# Patient Record
Sex: FEMALE | Race: WHITE | NOT HISPANIC OR LATINO | Employment: FULL TIME | URBAN - METROPOLITAN AREA
[De-identification: names, ages, dates, MRNs, and addresses within clinical notes are randomized per-mention and may not be internally consistent; named-entity substitution may affect disease eponyms.]

---

## 2020-03-29 ENCOUNTER — NURSE TRIAGE (OUTPATIENT)
Dept: ADMINISTRATIVE | Facility: CLINIC | Age: 40
End: 2020-03-29

## 2020-03-29 NOTE — TELEPHONE ENCOUNTER
Reason for Disposition   General information question, no triage required and triager able to answer question    Protocols used: INFORMATION ONLY CALL-A-AH    Pt stated she has been in direct contact with a Pt who tested positive for COVID 19 and now she has to be tested to go back to work. Pt stated she has a dry cough and mild wheezing. Pt advised to discuss with a Health Care Provider at an [facility] virtual visit. Pt accepted virtual visit information, and verbalized understanding of instructions. Advised to go to the ED for sob/difficulty breathing.

## 2021-03-03 ENCOUNTER — TELEPHONE (OUTPATIENT)
Dept: RHEUMATOLOGY | Facility: CLINIC | Age: 41
End: 2021-03-03

## 2021-03-30 ENCOUNTER — OFFICE VISIT (OUTPATIENT)
Dept: RHEUMATOLOGY | Facility: CLINIC | Age: 41
End: 2021-03-30
Payer: COMMERCIAL

## 2021-03-30 VITALS
DIASTOLIC BLOOD PRESSURE: 80 MMHG | BODY MASS INDEX: 26.98 KG/M2 | WEIGHT: 158.06 LBS | SYSTOLIC BLOOD PRESSURE: 112 MMHG | HEIGHT: 64 IN

## 2021-03-30 DIAGNOSIS — L98.3 WELLS' SYNDROME: ICD-10-CM

## 2021-03-30 DIAGNOSIS — Z79.899 HIGH RISK MEDICATION USE: ICD-10-CM

## 2021-03-30 DIAGNOSIS — R79.82 CRP ELEVATED: ICD-10-CM

## 2021-03-30 DIAGNOSIS — Z71.89 COUNSELING ON HEALTH PROMOTION AND DISEASE PREVENTION: ICD-10-CM

## 2021-03-30 DIAGNOSIS — M25.50 ARTHRALGIA, UNSPECIFIED JOINT: Primary | ICD-10-CM

## 2021-03-30 PROCEDURE — 99999 PR PBB SHADOW E&M-EST. PATIENT-LVL III: CPT | Mod: PBBFAC,,, | Performed by: INTERNAL MEDICINE

## 2021-03-30 PROCEDURE — 1125F AMNT PAIN NOTED PAIN PRSNT: CPT | Mod: S$GLB,,, | Performed by: INTERNAL MEDICINE

## 2021-03-30 PROCEDURE — 99205 OFFICE O/P NEW HI 60 MIN: CPT | Mod: S$GLB,,, | Performed by: INTERNAL MEDICINE

## 2021-03-30 PROCEDURE — 99205 PR OFFICE/OUTPT VISIT, NEW, LEVL V, 60-74 MIN: ICD-10-PCS | Mod: S$GLB,,, | Performed by: INTERNAL MEDICINE

## 2021-03-30 PROCEDURE — 99999 PR PBB SHADOW E&M-EST. PATIENT-LVL III: ICD-10-PCS | Mod: PBBFAC,,, | Performed by: INTERNAL MEDICINE

## 2021-03-30 PROCEDURE — 3008F BODY MASS INDEX DOCD: CPT | Mod: CPTII,S$GLB,, | Performed by: INTERNAL MEDICINE

## 2021-03-30 PROCEDURE — 1125F PR PAIN SEVERITY QUANTIFIED, PAIN PRESENT: ICD-10-PCS | Mod: S$GLB,,, | Performed by: INTERNAL MEDICINE

## 2021-03-30 PROCEDURE — 3008F PR BODY MASS INDEX (BMI) DOCUMENTED: ICD-10-PCS | Mod: CPTII,S$GLB,, | Performed by: INTERNAL MEDICINE

## 2021-03-30 RX ORDER — BUDESONIDE 0.25 MG/2ML
0.25 INHALANT ORAL DAILY
COMMUNITY

## 2021-03-30 RX ORDER — FOLIC ACID 1 MG/1
1 TABLET ORAL DAILY
Qty: 30 TABLET | Refills: 4 | Status: SHIPPED | OUTPATIENT
Start: 2021-03-30 | End: 2022-03-30

## 2021-03-30 RX ORDER — ESCITALOPRAM OXALATE 20 MG/1
TABLET ORAL
COMMUNITY
Start: 2021-03-05

## 2021-03-30 RX ORDER — ALBUTEROL SULFATE 90 UG/1
AEROSOL, METERED RESPIRATORY (INHALATION)
COMMUNITY
Start: 2021-03-11

## 2021-03-30 RX ORDER — METHOTREXATE 2.5 MG/1
10 TABLET ORAL
Qty: 16 TABLET | Refills: 3 | Status: SHIPPED | OUTPATIENT
Start: 2021-03-30 | End: 2022-11-16

## 2021-03-30 RX ORDER — HYDROXYCHLOROQUINE SULFATE 200 MG/1
200 TABLET, FILM COATED ORAL 2 TIMES DAILY
COMMUNITY
Start: 2021-03-05 | End: 2022-11-16

## 2021-03-30 RX ORDER — BUDESONIDE AND FORMOTEROL FUMARATE DIHYDRATE 160; 4.5 UG/1; UG/1
2 AEROSOL RESPIRATORY (INHALATION) 2 TIMES DAILY
COMMUNITY
Start: 2021-03-29

## 2021-03-30 RX ORDER — FAMOTIDINE 40 MG/1
40 TABLET, FILM COATED ORAL 2 TIMES DAILY
COMMUNITY
Start: 2021-03-05

## 2021-03-30 RX ORDER — FLUTICASONE FUROATE 200 UG/1
1 POWDER RESPIRATORY (INHALATION) DAILY
COMMUNITY
Start: 2021-03-12

## 2021-04-01 ENCOUNTER — TELEPHONE (OUTPATIENT)
Dept: RHEUMATOLOGY | Facility: CLINIC | Age: 41
End: 2021-04-01

## 2021-04-12 ENCOUNTER — TELEPHONE (OUTPATIENT)
Dept: RHEUMATOLOGY | Facility: CLINIC | Age: 41
End: 2021-04-12

## 2021-04-19 ENCOUNTER — TELEPHONE (OUTPATIENT)
Dept: RHEUMATOLOGY | Facility: CLINIC | Age: 41
End: 2021-04-19

## 2021-04-29 ENCOUNTER — PATIENT MESSAGE (OUTPATIENT)
Dept: RESEARCH | Facility: HOSPITAL | Age: 41
End: 2021-04-29

## 2022-03-10 ENCOUNTER — PATIENT MESSAGE (OUTPATIENT)
Dept: RHEUMATOLOGY | Facility: CLINIC | Age: 42
End: 2022-03-10
Payer: COMMERCIAL

## 2022-04-29 ENCOUNTER — TELEPHONE (OUTPATIENT)
Dept: RHEUMATOLOGY | Facility: CLINIC | Age: 42
End: 2022-04-29
Payer: COMMERCIAL

## 2022-10-07 ENCOUNTER — OFFICE VISIT (OUTPATIENT)
Dept: RHEUMATOLOGY | Facility: CLINIC | Age: 42
End: 2022-10-07
Payer: COMMERCIAL

## 2022-10-07 ENCOUNTER — HOSPITAL ENCOUNTER (OUTPATIENT)
Dept: RADIOLOGY | Facility: HOSPITAL | Age: 42
Discharge: HOME | End: 2022-10-07
Attending: INTERNAL MEDICINE
Payer: COMMERCIAL

## 2022-10-07 VITALS
WEIGHT: 154.56 LBS | DIASTOLIC BLOOD PRESSURE: 70 MMHG | BODY MASS INDEX: 26.39 KG/M2 | SYSTOLIC BLOOD PRESSURE: 109 MMHG | HEART RATE: 68 BPM | HEIGHT: 64 IN

## 2022-10-07 DIAGNOSIS — G93.32 CHRONIC FATIGUE SYNDROME: Primary | ICD-10-CM

## 2022-10-07 DIAGNOSIS — L98.3 WELLS' SYNDROME: ICD-10-CM

## 2022-10-07 DIAGNOSIS — G93.32 CHRONIC FATIGUE SYNDROME: ICD-10-CM

## 2022-10-07 DIAGNOSIS — R53.1 WEAKNESS: ICD-10-CM

## 2022-10-07 DIAGNOSIS — M47.819 SPONDYLOARTHROPATHY: Primary | ICD-10-CM

## 2022-10-07 PROCEDURE — 3074F PR MOST RECENT SYSTOLIC BLOOD PRESSURE < 130 MM HG: ICD-10-PCS | Mod: CPTII,S$GLB,, | Performed by: INTERNAL MEDICINE

## 2022-10-07 PROCEDURE — 1159F MED LIST DOCD IN RCRD: CPT | Mod: CPTII,S$GLB,, | Performed by: INTERNAL MEDICINE

## 2022-10-07 PROCEDURE — 72200 X-RAY EXAM SI JOINTS: CPT | Mod: 26,,, | Performed by: STUDENT IN AN ORGANIZED HEALTH CARE EDUCATION/TRAINING PROGRAM

## 2022-10-07 PROCEDURE — 99999 PR PBB SHADOW E&M-EST. PATIENT-LVL IV: ICD-10-PCS | Mod: PBBFAC,,, | Performed by: INTERNAL MEDICINE

## 2022-10-07 PROCEDURE — 3074F SYST BP LT 130 MM HG: CPT | Mod: CPTII,S$GLB,, | Performed by: INTERNAL MEDICINE

## 2022-10-07 PROCEDURE — 72200 X-RAY EXAM SI JOINTS: CPT | Mod: TC

## 2022-10-07 PROCEDURE — 99999 PR PBB SHADOW E&M-EST. PATIENT-LVL IV: CPT | Mod: PBBFAC,,, | Performed by: INTERNAL MEDICINE

## 2022-10-07 PROCEDURE — 72200 XR SACROILIAC JOINTS 3 VIEWS: ICD-10-PCS | Mod: 26,,, | Performed by: STUDENT IN AN ORGANIZED HEALTH CARE EDUCATION/TRAINING PROGRAM

## 2022-10-07 PROCEDURE — 3078F PR MOST RECENT DIASTOLIC BLOOD PRESSURE < 80 MM HG: ICD-10-PCS | Mod: CPTII,S$GLB,, | Performed by: INTERNAL MEDICINE

## 2022-10-07 PROCEDURE — 3008F BODY MASS INDEX DOCD: CPT | Mod: CPTII,S$GLB,, | Performed by: INTERNAL MEDICINE

## 2022-10-07 PROCEDURE — 99215 OFFICE O/P EST HI 40 MIN: CPT | Mod: S$GLB,,, | Performed by: INTERNAL MEDICINE

## 2022-10-07 PROCEDURE — 3008F PR BODY MASS INDEX (BMI) DOCUMENTED: ICD-10-PCS | Mod: CPTII,S$GLB,, | Performed by: INTERNAL MEDICINE

## 2022-10-07 PROCEDURE — 99215 PR OFFICE/OUTPT VISIT, EST, LEVL V, 40-54 MIN: ICD-10-PCS | Mod: S$GLB,,, | Performed by: INTERNAL MEDICINE

## 2022-10-07 PROCEDURE — 3078F DIAST BP <80 MM HG: CPT | Mod: CPTII,S$GLB,, | Performed by: INTERNAL MEDICINE

## 2022-10-07 PROCEDURE — 1159F PR MEDICATION LIST DOCUMENTED IN MEDICAL RECORD: ICD-10-PCS | Mod: CPTII,S$GLB,, | Performed by: INTERNAL MEDICINE

## 2022-10-07 RX ORDER — GABAPENTIN 100 MG/1
100 CAPSULE ORAL 3 TIMES DAILY
Qty: 90 CAPSULE | Refills: 3 | Status: SHIPPED | OUTPATIENT
Start: 2022-10-07 | End: 2023-10-07

## 2022-10-07 NOTE — PROGRESS NOTES
"Contacted patient to discuss imaging results and recommendations with her per Dr. Hood. Patient verbalized understanding. All questions answered.     Martha Marnia (Allye), [facility]  Rheumatology Department   "

## 2022-10-07 NOTE — PROGRESS NOTES
RHEUMATOLOGY OUTPATIENT CLINIC NOTE    10/7/2022    Attending Rheumatologist: Apolinar Hood  Primary Care Provider/Physician Requesting Consultation: MALACHI Raman MD   Chief Complaint/Reason For Consultation:  No chief complaint on file.      Subjective:     Sabrina White is a 42 y.o. 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    Main concern of chronic widespread arthralgias.  Sub-adequate response with Plaquenil, did not undergo methotrexate trial recommended on last encounter.    Review of Systems   Constitutional:  Positive for malaise/fatigue. Negative for fever.   HENT:  Negative for nosebleeds.         Denies significant sicca symptoms   Eyes:  Negative for photophobia and pain.        No history uveitis   Respiratory:  Negative for hemoptysis and shortness of breath.    Gastrointestinal:  Negative for blood in stool, diarrhea and melena.        Denies dysphagia.   Genitourinary:  Negative for dysuria, hematuria and urgency.   Musculoskeletal:  Positive for joint pain (Hands and lower body, predominant mechanical pattern.).   Skin:  Negative for rash.        Denies Raynaud phenomena.  Denies photosensitivity.  No history of psoriasis the patient is aware of.   Neurological:  Negative for focal weakness.   Endo/Heme/Allergies:         No history of vascular thrombosis.     Chronic comorbid conditions affecting medical decision making today:  Past Medical History:   Diagnosis Date    GERD (gastroesophageal reflux disease)      History reviewed. No pertinent surgical history.  History reviewed. No pertinent family history.  Social History     Tobacco Use   Smoking Status Never   Smokeless Tobacco Never       Current Outpatient Medications:     ARNUITY ELLIPTA 200 mcg/actuation inhaler, Inhale 1 puff into the lungs once daily., Disp: , Rfl:     benralizumab (FASENRA SUBQ), Inject into the skin. Every 4 weeks, Disp: , Rfl:     budesonide (PULMICORT) 0.25 mg/2 mL  nebulizer solution, Take 0.25 mg by nebulization once daily. Controller, Disp: , Rfl:     EScitalopram oxalate (LEXAPRO) 20 MG tablet, , Disp: , Rfl:     famotidine (PEPCID) 40 MG tablet, Take 40 mg by mouth 2 (two) times daily. , Disp: , Rfl:     hydrOXYchloroQUINE (PLAQUENIL) 200 mg tablet, Take 200 mg by mouth 2 (two) times daily. , Disp: , Rfl:     SYMBICORT 160-4.5 mcg/actuation HFAA, Inhale 2 puffs into the lungs 2 (two) times daily., Disp: , Rfl:     VENTOLIN HFA 90 mcg/actuation inhaler, SMARTSI Puff(s) By Mouth Every 4 Hours PRN, Disp: , Rfl:     folic acid (FOLVITE) 1 MG tablet, Take 1 tablet (1 mg total) by mouth once daily., Disp: 30 tablet, Rfl: 4    methotrexate 2.5 MG Tab, Take 4 tablets (10 mg total) by mouth every 7 days., Disp: 16 tablet, Rfl: 3    ondansetron (ZOFRAN) 4 MG tablet, Take 1 tablet (4 mg total) by mouth every 8 (eight) hours as needed for Nausea. (Patient not taking: Reported on 10/7/2022), Disp: 15 tablet, Rfl: 0    ondansetron (ZOFRAN-ODT) 8 MG TbDL, Take 1 tablet (8 mg total) by mouth every 8 (eight) hours as needed., Disp: 15 tablet, Rfl: 0    promethazine (PHENERGAN) 25 MG suppository, Place 1 suppository (25 mg total) rectally every 6 (six) hours as needed for Nausea., Disp: 10 suppository, Rfl: 0     Objective:     Vitals:    10/07/22 0714   BP: 109/70   Pulse: 68     Physical Exam   Eyes: Conjunctivae are normal.   Pulmonary/Chest: Effort normal. No respiratory distress.   Musculoskeletal:         General: No swelling or tenderness. Normal range of motion.   Neurological: She displays no weakness.   Skin: No rash noted.     Reviewed available old and all outside pertinent medical records available.    All lab results personally reviewed and interpreted by me.       ASSESSMENT:     Chronic fatigue syndrome    Weakness    Chronic low back pain    Well syndrome (eosinophilic cellulitis)    PLAN:     Follow-up encounter for chronic fatigue/pain syndrome.  History of well  syndrome without active rashes.  On chronic therapy with Plaquenil per immunology.  Patient main concern of widespread pain and refractory intermittent rash.  Describes similar appearance to previously biopsy rash with features of well syndrome.  Review of system negative for photosensitivity, Raynaud's phenomena, or significant sicca symptoms.  Describes subjective weakness.  Exam nonfocal without reproducible muscle weakness, suspicious rashes, or significant squeeze tenderness suggestive of subclinical synovitis.  Notable [place] nodules with tenderness to palpation.  No recent labs on file.  Previously without significant cytopenias or evidence of liver or kidney dysfunction.  ROB provided negative, CCP antibody negative.  Imaging on file without serositis, lymphadenopathy, marginal erosive changes, or ankylosis.  Left upper abdomen biopsy May 2020 with characteristics of eosinophilic cellulitis with negative direct immunofluorescence.  Recommend repeating rheumatic autoimmune labs for prognosis and determine if patient falls on the category of a particular rheumatic disorder.  Recommend evaluation by Dermatology for eosinophilic cellulitis.  So adequate response to Plaquenil, might benefit from addition of other steroid sparing agent previously recommended methotrexate however patient would like to receive a solid diagnosis before proceeding with pharmacotherapy trial.  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  Consider therapy with anticonvulsant or SNRI after ruling out primary rheumatic autoimmune condition.      Disclaimer: This note is prepared using voice recognition software and as such is likely to have errors and has not been proof read. Please contact me for questions.       Apolinar Hood M.D.

## 2022-10-24 ENCOUNTER — HOSPITAL ENCOUNTER (OUTPATIENT)
Dept: RADIOLOGY | Facility: HOSPITAL | Age: 42
Discharge: HOME | End: 2022-10-24
Attending: INTERNAL MEDICINE
Payer: COMMERCIAL

## 2022-10-24 DIAGNOSIS — M47.819 SPONDYLOARTHROPATHY: ICD-10-CM

## 2022-10-24 PROCEDURE — 72197 MRI PELVIS W/O & W/DYE: CPT | Mod: 26,,, | Performed by: RADIOLOGY

## 2022-10-24 PROCEDURE — 72197 MRI PELVIS W/O & W/DYE: CPT | Mod: TC,PO

## 2022-10-24 PROCEDURE — A9585 GADOBUTROL INJECTION: HCPCS | Mod: PO | Performed by: INTERNAL MEDICINE

## 2022-10-24 PROCEDURE — 72197 MRI SACROILIAC JOINTS W W/O CONTRAST: ICD-10-PCS | Mod: 26,,, | Performed by: RADIOLOGY

## 2022-10-24 PROCEDURE — 25500020 PHARM REV CODE 255: Mod: PO | Performed by: INTERNAL MEDICINE

## 2022-10-24 RX ORDER — GADOBUTROL 604.72 MG/ML
7 INJECTION INTRAVENOUS
Status: COMPLETED | OUTPATIENT
Start: 2022-10-24 | End: 2022-10-24

## 2022-10-24 RX ADMIN — GADOBUTROL 7 ML: 604.72 INJECTION INTRAVENOUS at 04:10

## 2022-11-15 ENCOUNTER — PATIENT MESSAGE (OUTPATIENT)
Dept: RHEUMATOLOGY | Facility: CLINIC | Age: 42
End: 2022-11-15
Payer: COMMERCIAL

## 2022-11-16 ENCOUNTER — OFFICE VISIT (OUTPATIENT)
Dept: RHEUMATOLOGY | Facility: CLINIC | Age: 42
End: 2022-11-16
Payer: COMMERCIAL

## 2022-11-16 ENCOUNTER — PATIENT MESSAGE (OUTPATIENT)
Dept: RHEUMATOLOGY | Facility: CLINIC | Age: 42
End: 2022-11-16

## 2022-11-16 VITALS
DIASTOLIC BLOOD PRESSURE: 79 MMHG | BODY MASS INDEX: 25.35 KG/M2 | SYSTOLIC BLOOD PRESSURE: 118 MMHG | HEART RATE: 73 BPM | HEIGHT: 64 IN | WEIGHT: 148.5 LBS

## 2022-11-16 DIAGNOSIS — M15.9 PRIMARY OSTEOARTHRITIS INVOLVING MULTIPLE JOINTS: Primary | ICD-10-CM

## 2022-11-16 DIAGNOSIS — M25.50 POLYARTHRALGIA: ICD-10-CM

## 2022-11-16 DIAGNOSIS — M79.7 FIBROMYALGIA: ICD-10-CM

## 2022-11-16 PROCEDURE — 3008F PR BODY MASS INDEX (BMI) DOCUMENTED: ICD-10-PCS | Mod: CPTII,S$GLB,, | Performed by: INTERNAL MEDICINE

## 2022-11-16 PROCEDURE — 99214 OFFICE O/P EST MOD 30 MIN: CPT | Mod: S$GLB,,, | Performed by: INTERNAL MEDICINE

## 2022-11-16 PROCEDURE — 99999 PR PBB SHADOW E&M-EST. PATIENT-LVL III: CPT | Mod: PBBFAC,,, | Performed by: INTERNAL MEDICINE

## 2022-11-16 PROCEDURE — 3008F BODY MASS INDEX DOCD: CPT | Mod: CPTII,S$GLB,, | Performed by: INTERNAL MEDICINE

## 2022-11-16 PROCEDURE — 3078F PR MOST RECENT DIASTOLIC BLOOD PRESSURE < 80 MM HG: ICD-10-PCS | Mod: CPTII,S$GLB,, | Performed by: INTERNAL MEDICINE

## 2022-11-16 PROCEDURE — 3074F SYST BP LT 130 MM HG: CPT | Mod: CPTII,S$GLB,, | Performed by: INTERNAL MEDICINE

## 2022-11-16 PROCEDURE — 3074F PR MOST RECENT SYSTOLIC BLOOD PRESSURE < 130 MM HG: ICD-10-PCS | Mod: CPTII,S$GLB,, | Performed by: INTERNAL MEDICINE

## 2022-11-16 PROCEDURE — 99214 PR OFFICE/OUTPT VISIT, EST, LEVL IV, 30-39 MIN: ICD-10-PCS | Mod: S$GLB,,, | Performed by: INTERNAL MEDICINE

## 2022-11-16 PROCEDURE — 3078F DIAST BP <80 MM HG: CPT | Mod: CPTII,S$GLB,, | Performed by: INTERNAL MEDICINE

## 2022-11-16 PROCEDURE — 1159F PR MEDICATION LIST DOCUMENTED IN MEDICAL RECORD: ICD-10-PCS | Mod: CPTII,S$GLB,, | Performed by: INTERNAL MEDICINE

## 2022-11-16 PROCEDURE — 99999 PR PBB SHADOW E&M-EST. PATIENT-LVL III: ICD-10-PCS | Mod: PBBFAC,,, | Performed by: INTERNAL MEDICINE

## 2022-11-16 PROCEDURE — 1159F MED LIST DOCD IN RCRD: CPT | Mod: CPTII,S$GLB,, | Performed by: INTERNAL MEDICINE

## 2022-11-16 RX ORDER — CYCLOBENZAPRINE HCL 10 MG
10 TABLET ORAL NIGHTLY
Qty: 30 TABLET | Refills: 3 | Status: SHIPPED | OUTPATIENT
Start: 2022-11-16 | End: 2022-11-26

## 2022-11-16 NOTE — PROGRESS NOTES
RHEUMATOLOGY OUTPATIENT CLINIC NOTE    11/16/2022    Attending Rheumatologist: Apolinar Hood  Primary Care Provider/Physician Requesting Consultation: MALACHI Raman MD   Chief Complaint/Reason For Consultation:  Joint Pain, Wells Syndrome, and Chronic fatigue sydrome      Subjective:     Sabrina White is a 42 y.o. White female with fibromyalgia for follow up visit.    Main concern of chronic intermittent widespread arthralgias with mechanical pattern.    Review of Systems   Constitutional:  Positive for malaise/fatigue. Negative for fever.   Eyes:  Negative for pain.   Respiratory:  Negative for shortness of breath.    Gastrointestinal:  Negative for blood in stool and melena.   Musculoskeletal:  Positive for joint pain and myalgias.   Skin:  Negative for rash.   Neurological:  Negative for focal weakness.     Chronic comorbid conditions affecting medical decision making today:  Past Medical History:   Diagnosis Date    GERD (gastroesophageal reflux disease)      History reviewed. No pertinent surgical history.  History reviewed. No pertinent family history.  Social History     Tobacco Use   Smoking Status Never   Smokeless Tobacco Never       Current Outpatient Medications:     ARNUITY ELLIPTA 200 mcg/actuation inhaler, Inhale 1 puff into the lungs once daily., Disp: , Rfl:     benralizumab (FASENRA SUBQ), Inject into the skin. Every 4 weeks, Disp: , Rfl:     budesonide (PULMICORT) 0.25 mg/2 mL nebulizer solution, Take 0.25 mg by nebulization once daily. Controller, Disp: , Rfl:     EScitalopram oxalate (LEXAPRO) 20 MG tablet, , Disp: , Rfl:     famotidine (PEPCID) 40 MG tablet, Take 40 mg by mouth 2 (two) times daily. , Disp: , Rfl:     gabapentin (NEURONTIN) 100 MG capsule, Take 1 capsule (100 mg total) by mouth 3 (three) times daily., Disp: 90 capsule, Rfl: 3    hydrOXYchloroQUINE (PLAQUENIL) 200 mg tablet, Take 200 mg by mouth 2 (two) times daily. , Disp: , Rfl:     ondansetron (ZOFRAN) 4 MG tablet,  Take 1 tablet (4 mg total) by mouth every 8 (eight) hours as needed for Nausea., Disp: 15 tablet, Rfl: 0    ondansetron (ZOFRAN-ODT) 8 MG TbDL, Take 1 tablet (8 mg total) by mouth every 8 (eight) hours as needed., Disp: 15 tablet, Rfl: 0    promethazine (PHENERGAN) 25 MG suppository, Place 1 suppository (25 mg total) rectally every 6 (six) hours as needed for Nausea., Disp: 10 suppository, Rfl: 0    SYMBICORT 160-4.5 mcg/actuation HFAA, Inhale 2 puffs into the lungs 2 (two) times daily., Disp: , Rfl:     VENTOLIN HFA 90 mcg/actuation inhaler, SMARTSI Puff(s) By Mouth Every 4 Hours PRN, Disp: , Rfl:     folic acid (FOLVITE) 1 MG tablet, Take 1 tablet (1 mg total) by mouth once daily., Disp: 30 tablet, Rfl: 4    methotrexate 2.5 MG Tab, Take 4 tablets (10 mg total) by mouth every 7 days., Disp: 16 tablet, Rfl: 3     Objective:     Vitals:    22 1252   BP: 118/79   Pulse: 73     Physical Exam    Reviewed available old and all outside pertinent medical records available.    All lab results personally reviewed and interpreted by me.     ASSESSMENT:     Fibromyalgia    Erosive osteoarthritis    Knee pain    PLAN:     Follow-up encounter for symptoms of fibromyalgia.  No response to trial of Plaquenil or methotrexate.  Main concern of chronic widespread intermittent pain.  Remains without active photosensitive rashes, ulcerative skin lesions, or current joint swelling.  No reproducible muscle weakness or active synovitis on exam.  Unrevealing rheumatic workup.  Imaging on file without marginal erosive changes or ankylosis.  MRI without evidence of inflammatory sacroiliitis.  Adequate response to gabapentin, currently 100 mg t.i.d. Started approximately 1 month ago, still too early to see full response to therapy expected after 3 months.  May continue gabapentin dose adjustment with primary care provider.  Addition of Flexeril for breakthrough pain.  Side effects therapy discussed, written literature provided.  CBC  CMP to monitor for toxicity related to medication use, repeat labs prior follow-up encounter.      Disclaimer: This note is prepared using voice recognition software and as such is likely to have errors and has not been proof read. Please contact me for questions.         Apolinar Hood M.D.

## 2023-12-04 ENCOUNTER — TELEPHONE (OUTPATIENT)
Dept: RHEUMATOLOGY | Facility: CLINIC | Age: 43
End: 2023-12-04
Payer: COMMERCIAL

## 2023-12-04 NOTE — TELEPHONE ENCOUNTER
----- Message from Maira Hunter sent at 12/4/2023  9:49 AM CST -----  Type: Appointment Request    Caller is requesting a appointment.     Name of Caller: Pt   When is the first available appointment? No  dates  given    Symptoms: Schedule  an  appointment   Would the patient rather a call back or a response via [facility]? Call   Best Call Back Number:[phone]  Additional Information:

## 2023-12-04 NOTE — TELEPHONE ENCOUNTER
"Returned patients phone call. Patient stated that she missed her visit with Dr. Hood last week due to her having the FLU. Offered patient virtual visit with Dr. Hood in the morning 12/05/2023 at 7:30. Patient gladly accepted and thanked me for the return phone call. All questions answered.      Martha Marina (Allye), [facility]  Rheumatology Department    "

## 2023-12-05 ENCOUNTER — OFFICE VISIT (OUTPATIENT)
Dept: RHEUMATOLOGY | Facility: CLINIC | Age: 43
End: 2023-12-05
Payer: COMMERCIAL

## 2023-12-05 DIAGNOSIS — M15.9 PRIMARY OSTEOARTHRITIS INVOLVING MULTIPLE JOINTS: ICD-10-CM

## 2023-12-05 DIAGNOSIS — M79.7 FIBROMYALGIA: ICD-10-CM

## 2023-12-05 DIAGNOSIS — R53.82 CHRONIC FATIGUE: ICD-10-CM

## 2023-12-05 DIAGNOSIS — M54.50 CHRONIC LOW BACK PAIN, UNSPECIFIED BACK PAIN LATERALITY, UNSPECIFIED WHETHER SCIATICA PRESENT: ICD-10-CM

## 2023-12-05 DIAGNOSIS — G89.29 CHRONIC LOW BACK PAIN, UNSPECIFIED BACK PAIN LATERALITY, UNSPECIFIED WHETHER SCIATICA PRESENT: ICD-10-CM

## 2023-12-05 DIAGNOSIS — M70.60 TROCHANTERIC BURSITIS, UNSPECIFIED LATERALITY: ICD-10-CM

## 2023-12-05 DIAGNOSIS — M15.4 EROSIVE OSTEOARTHRITIS: Primary | ICD-10-CM

## 2023-12-05 PROCEDURE — 99214 OFFICE O/P EST MOD 30 MIN: CPT | Mod: 95,,, | Performed by: INTERNAL MEDICINE

## 2023-12-05 PROCEDURE — 99214 PR OFFICE/OUTPT VISIT, EST, LEVL IV, 30-39 MIN: ICD-10-PCS | Mod: 95,,, | Performed by: INTERNAL MEDICINE

## 2023-12-05 NOTE — PROGRESS NOTES
The patient location is: [place]  The chief complaint leading to consultation is: pain    Visit type: audiovisual    Face to Face time with patient: 20  30 minutes of total time spent on the encounter, which includes face to face time and non-face to face time preparing to see the patient (eg, review of tests), Obtaining and/or reviewing separately obtained history, Documenting clinical information in the electronic or other health record, Independently interpreting results (not separately reported) and communicating results to the patient/family/caregiver, or Care coordination (not separately reported).         Each patient to whom he or she provides medical services by telemedicine is:  (1) informed of the relationship between the physician and patient and the respective role of any other health care provider with respect to management of the patient; and (2) notified that he or she may decline to receive medical services by telemedicine and may withdraw from such care at any time.       RHEUMATOLOGY OUTPATIENT CLINIC NOTE    12/5/2023    Attending Rheumatologist: Apolinar Hood  Primary Care Provider/Physician Requesting Consultation: CINDY Raman MD   Chief Complaint/Reason For Consultation:  No chief complaint on file.      Subjective:     Sabrina White is a 43 y.o. White female with FMS    Interval self-discontinuation of gabapentin.  Symptomatic relief from self insight and increased aerobic activity.    Review of Systems   Constitutional:  Negative for fever.   Eyes:  Negative for redness.   Respiratory:  Negative for cough and shortness of breath.    Cardiovascular:  Negative for chest pain.   Gastrointestinal:  Negative for constipation and diarrhea.   Genitourinary:  Negative for dysuria.   Musculoskeletal:  Positive for joint pain. Negative for back pain.   Skin:  Negative for rash.   Neurological:  Negative for headaches.   Endo/Heme/Allergies:  Does not bruise/bleed easily.       Chronic comorbid  conditions affecting medical decision making today:  Past Medical History:   Diagnosis Date    GERD (gastroesophageal reflux disease)      No past surgical history on file.  No family history on file.  Social History     Tobacco Use   Smoking Status Never   Smokeless Tobacco Never       Current Outpatient Medications:     ARNUITY ELLIPTA 200 mcg/actuation inhaler, Inhale 1 puff into the lungs once daily., Disp: , Rfl:     benralizumab (FASENRA SUBQ), Inject into the skin. Every 4 weeks, Disp: , Rfl:     budesonide (PULMICORT) 0.25 mg/2 mL nebulizer solution, Take 0.25 mg by nebulization once daily. Controller, Disp: , Rfl:     EScitalopram oxalate (LEXAPRO) 20 MG tablet, , Disp: , Rfl:     famotidine (PEPCID) 40 MG tablet, Take 40 mg by mouth 2 (two) times daily. , Disp: , Rfl:     folic acid (FOLVITE) 1 MG tablet, Take 1 tablet (1 mg total) by mouth once daily., Disp: 30 tablet, Rfl: 4    gabapentin (NEURONTIN) 100 MG capsule, Take 1 capsule (100 mg total) by mouth 3 (three) times daily., Disp: 90 capsule, Rfl: 3    ondansetron (ZOFRAN) 4 MG tablet, Take 1 tablet (4 mg total) by mouth every 8 (eight) hours as needed for Nausea., Disp: 15 tablet, Rfl: 0    ondansetron (ZOFRAN-ODT) 8 MG TbDL, Take 1 tablet (8 mg total) by mouth every 8 (eight) hours as needed., Disp: 15 tablet, Rfl: 0    promethazine (PHENERGAN) 25 MG suppository, Place 1 suppository (25 mg total) rectally every 6 (six) hours as needed for Nausea., Disp: 10 suppository, Rfl: 0    SYMBICORT 160-4.5 mcg/actuation HFAA, Inhale 2 puffs into the lungs 2 (two) times daily., Disp: , Rfl:     VENTOLIN HFA 90 mcg/actuation inhaler, SMARTSI Puff(s) By Mouth Every 4 Hours PRN, Disp: , Rfl:      Objective:     There were no vitals filed for this visit.  Physical Exam   Eyes: Conjunctivae are normal.   Pulmonary/Chest: Effort normal. No respiratory distress.   Musculoskeletal:         General: No swelling or tenderness. Normal range of motion.   Skin: No rash  noted.       Reviewed available old and all outside pertinent medical records available.    All lab results personally reviewed and interpreted by me.       ASSESSMENT      Encounter Diagnoses   Name Primary?    Primary osteoarthritis involving multiple joints     Chronic low back pain, unspecified back pain laterality, unspecified whether sciatica present     Trochanteric bursitis, unspecified laterality     Chronic fatigue     Fibromyalgia     Erosive osteoarthritis Yes      PLAN     Self limited arthralgias from OA.  Recurrent trochanteric bursa and mechanical back pain, excellent response with PT.  No squeeze tenderness or active inflammatory rashes on exam.  Negative ROB.  Negative RA serologies.  Negative myomarker panel. MRI w/o evidence of sacroiliitis.  May obtain sx relief w/ muscle relaxant PRN.  RTC PRN s/s inflammatory arthritis.    Apolinar Hood M.D.

## 2023-12-08 ENCOUNTER — PATIENT MESSAGE (OUTPATIENT)
Dept: RHEUMATOLOGY | Facility: CLINIC | Age: 43
End: 2023-12-08
Payer: COMMERCIAL

## 2023-12-08 NOTE — TELEPHONE ENCOUNTER
"Dr. Hood, patient would like a referral to [facility]. Please advise.    Martha Marina (Allye), [facility]  Rheumatology Department   "

## 2024-05-02 ENCOUNTER — PATIENT MESSAGE (OUTPATIENT)
Dept: RHEUMATOLOGY | Facility: CLINIC | Age: 44
End: 2024-05-02
Payer: COMMERCIAL

## 2024-05-10 DIAGNOSIS — M54.50 CHRONIC LOW BACK PAIN, UNSPECIFIED BACK PAIN LATERALITY, UNSPECIFIED WHETHER SCIATICA PRESENT: ICD-10-CM

## 2024-05-10 DIAGNOSIS — M54.50 CHRONIC LOW BACK PAIN, UNSPECIFIED BACK PAIN LATERALITY, UNSPECIFIED WHETHER SCIATICA PRESENT: Primary | ICD-10-CM

## 2024-05-10 DIAGNOSIS — G89.29 CHRONIC LOW BACK PAIN, UNSPECIFIED BACK PAIN LATERALITY, UNSPECIFIED WHETHER SCIATICA PRESENT: ICD-10-CM

## 2024-05-10 DIAGNOSIS — G89.29 CHRONIC LOW BACK PAIN, UNSPECIFIED BACK PAIN LATERALITY, UNSPECIFIED WHETHER SCIATICA PRESENT: Primary | ICD-10-CM

## 2024-05-10 RX ORDER — CYCLOBENZAPRINE HCL 10 MG
10 TABLET ORAL 2 TIMES DAILY PRN
Qty: 28 TABLET | Refills: 1 | Status: SHIPPED | OUTPATIENT
Start: 2024-05-10 | End: 2024-05-10 | Stop reason: SDUPTHER

## 2024-05-13 RX ORDER — CYCLOBENZAPRINE HCL 10 MG
10 TABLET ORAL 2 TIMES DAILY PRN
Qty: 28 TABLET | Refills: 1 | Status: SHIPPED | OUTPATIENT
Start: 2024-05-13 | End: 2024-06-10

## 2024-05-17 ENCOUNTER — CLINICAL SUPPORT (OUTPATIENT)
Dept: REHABILITATION | Facility: HOSPITAL | Age: 44
End: 2024-05-17
Attending: INTERNAL MEDICINE
Payer: COMMERCIAL

## 2024-05-17 DIAGNOSIS — G89.29 CHRONIC LOW BACK PAIN, UNSPECIFIED BACK PAIN LATERALITY, UNSPECIFIED WHETHER SCIATICA PRESENT: Primary | ICD-10-CM

## 2024-05-17 DIAGNOSIS — M25.551 HIP PAIN, BILATERAL: ICD-10-CM

## 2024-05-17 DIAGNOSIS — M25.552 HIP PAIN, BILATERAL: ICD-10-CM

## 2024-05-17 DIAGNOSIS — M54.50 CHRONIC LOW BACK PAIN, UNSPECIFIED BACK PAIN LATERALITY, UNSPECIFIED WHETHER SCIATICA PRESENT: Primary | ICD-10-CM

## 2024-05-17 DIAGNOSIS — G89.29 CENTRAL SENSITIZATION TO PAIN: ICD-10-CM

## 2024-05-17 PROCEDURE — 97162 PT EVAL MOD COMPLEX 30 MIN: CPT | Mod: PO

## 2024-05-17 PROCEDURE — 97530 THERAPEUTIC ACTIVITIES: CPT | Mod: PO

## 2024-05-17 NOTE — PLAN OF CARE
[facility]   Physical Therapy Initial Evaluation      Name: Sabrina White  [facility] Number: [id number]    Therapy Diagnosis:   Encounter Diagnoses   Name Primary?    Chronic low back pain, unspecified back pain laterality, unspecified whether sciatica present Yes    Hip pain, bilateral     Central sensitization to pain         Physician: Apolinar Hood MD    Physician Orders: PT Eval and Treat   Medical Diagnosis from Referral: M54.50,G89.29 (ICD-10-CM) - Chronic low back pain, unspecified back pain laterality, unspecified whether sciatica present  Evaluation Date: 5/17/2024  Authorization Period Expiration: 5/10/2025  Plan of Care Expiration: 7/12/2024  Progress Note Due: 6/14/2024  Date of Surgery: None  Visit # / Visits authorized: 1/ 1   FOTO: 1/ 3    Precautions: Standard     Time In: 720  Time Out: 803  Total Billable Time: 43 minutes    Subjective     Date of onset: Multiple years    History of current condition - Sabrina reports: having fibromyaglia. Was diagnosed in October of 2022. Mostly has pain in her hips. Mostly has pain in her right hip to her right knee, but will also have pain that wraps around her left hip and pelvis. Also has pain into her hands. Recently has had pain with first getting up in the morning. She states that her pain has been getting worse lately. Has also had pain into both of her shoulders and ribs. She states that she's had aquatic physical therapy in the past with good results.     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    Prior Therapy: Had aquatic physical therapy elsewhere in the past which she responded well to.   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 High kinesiophobia and anxiety    Pain:  Current 4/10, worst 10/10, best 4/10   Location: bilateral  hips/pelvis    Description: Aching, Dull, Sharp, Shooting, and Stabbing  Aggravating Factors: Standing, Walking, Morning, and Getting out of bed/chair  Easing Factors: relaxation and rest    Patients goals: Reduce kinesiophobia and improve movement quality     Medical History:   Past Medical History:   Diagnosis Date    GERD (gastroesophageal reflux disease)        Surgical History:   Sabrina White  has no past surgical history on file.    Medications:   Sabrina has a current medication list which includes the following prescription(s): arnuity ellipta, benralizumab, budesonide, cyclobenzaprine, escitalopram oxalate, famotidine, folic acid, gabapentin, ondansetron, ondansetron, promethazine, symbicort, and ventolin hfa.    Allergies:   Review of patient's allergies indicates:  No Known Allergies     Objective      LUMBAR SPINE AROM:   Flexion: 50   Extension: 30   Left Sidebend: 20   Right Sidebend: 30   Left Rotation: 45   Right Rotation: 30       LOWER EXTREMITY PROM   Left Right     Hip flex 110 110*   Hip ext NT NT   Hip IR 30 15*   Hip ER 60 60     LOWER EXTREMITY STRENGTH:   Left Right   Quadriceps 4+/5 3+/5   Hamstrings 4/5 4/5     Iliopsoas 4+/5 3/5   PGM 3/5 3/5   Hip IR 4/5 4/5   Hip ER 4/5 4+/5   Hip Ext 3/5 3/5       DTR's:   Left Right   Patella Tendon 2+ 2+   Achilles Tendon 2+ 2+     Dermatomes: Sensation: Light Touch: Intact  Myotomes: Normal  Flexibility: Significant reductions in hamstring flexibility bilaterally  Palpation: Tender to palpation along lateral hip at greater trochanter    (+) FADIR test on right hip with recreation of groin pain    Intake Outcome Measure for FOTO Lumbar Survey    Therapist reviewed FOTO scores for Sabrina White on 5/17/2024.   FOTO report - see Media section or FOTO account episode details.    Intake Score: 38%         Treatment     Total Treatment time (time-based codes) separate from Evaluation: 10 minutes     Sabrina received the treatments listed below:       therapeutic exercises to develop strength, endurance, ROM, flexibility, and core stabilization for 0 minutes including:      manual therapy techniques: Joint mobilizations, Manual traction, Myofacial release, and Soft tissue Mobilization were applied to the:  for 0 minutes, including:      neuromuscular re-education activities to improve: Coordination, Kinesthetic, Sense, and Proprioception for 0 minutes. The following activities were included:      therapeutic activities to improve functional performance for 10  minutes, including:  Patient was educated, instructed, and was demonstrated exercises for her HEP to improve performance and compliance. Exercises included:  Diaphragmatic breathing  SLR  DKTC  Supine hamstring stretch  LTR      Patient Education and Home Exercises     Education provided:   - patient was educated on exam findings, benefits of aquatic physical therapy and breathing exercises for increased parasympathetic input, and possible contributions of separate hip pathology.    Written Home Exercises Provided: yes. Exercises were reviewed and Sabrina was able to demonstrate them prior to the end of the session.  Sabrina demonstrated fair  understanding of the education provided. See EMR under Patient Instructions for exercises provided during therapy sessions.    Assessment     Sabrina is a 44 y.o. female referred to outpatient Physical Therapy with a medical diagnosis of M54.50,G89.29 (ICD-10-CM) - Chronic low back pain, unspecified back pain laterality, unspecified whether sciatica present. Patient presents with chief complaint of multiple areas of pain, but mostly in her hips with right being more painful than left. Unable to take all measurements today 2/2 time constraints and patient tardiness. She presents with limitations in her lumbar and right hip range of motion 2/2 pain. Pain in right hip seems to arise from intra-articular pathology based on subjective reports, positive FADIR test, and pain  with deep hip flexion passively. She does have increases tenderness at her subtrochanteric bursa on her right femur. She has had success with physical therapy prior that has combined aquatic exercises and land-based physical therapy, with modalities such as dry needling. We will try a similar approach and combine aquatic physical therapy and land-based physical therapy, although variability in patient's symptom presentation may not allow for exercises on land, and may require modification of treatment plan.     Patient prognosis is Fair.   Patient will benefit from skilled outpatient Physical Therapy to address the deficits stated above and in the chart below, provide patient /family education, and to maximize patientt's level of independence.     Plan of care discussed with patient: Yes  Patient's spiritual, cultural and educational needs considered and patient is agreeable to the plan of care and goals as stated below:     Anticipated Barriers for therapy: High anxiety and variability of pain    Medical Necessity is demonstrated by the following  History  Co-morbidities and personal factors that may impact the plan of care [] LOW: no personal factors / co-morbidities  [] MODERATE: 1-2 personal factors / co-morbidities  [x] HIGH: 3+ personal factors / co-morbidities    Moderate / High Support Documentation:   Co-morbidities affecting plan of care: high anxiety, fibromyalgia    Personal Factors:   coping style  attitudes     Examination  Body Structures and Functions, activity limitations and participation restrictions that may impact the plan of care [] LOW: addressing 1-2 elements  [] MODERATE: 3+ elements  [x] HIGH: 4+ elements (please support below)    Moderate / High Support Documentation: addressing both hips, lumbar spine/sacrum, and heightened nervous system     Clinical Presentation [] LOW: stable  [x] MODERATE: Evolving  [] HIGH: Unstable     Decision Making/ Complexity Score: moderate       Goals:  Short  Term Goals: 4 weeks   Patient will be independent performing HEP in a pain-free way to impact her progress with physical therapy.  Patient will be able to move through normal lumbar AROM without pain to impact her ability to perform normal dressing tasks  Patient will report average, resting pain as </= 2/10 to impact her QoL.    Long Term Goals: 8 weeks   Patient will improve her FOTO score to >/= 53 to demonstrate significant improvements in function and ADL performance  Patient will report 2 consecutive weeks of no morning stiffness or limping to impact her mobility  Patient will report 50% reduction in kinesiophobia to impact her comfort with performing tasks and chores around her home.  Patient will report worst low back and hip pain as </= 6/10 to impact her tolerance to performing physical activity.      Plan     Plan of care Certification: 5/17/2024 to 7/12/2024.    Outpatient Physical Therapy 2 times weekly for 8 weeks to include the following interventions: Aquatic Therapy, Manual Therapy, Moist Heat/ Ice, Neuromuscular Re-ed, Patient Education, Therapeutic Activities, Therapeutic Exercise, and Dry Needling .     Mathieu Auguste, PT        Physician's Signature: _________________________________________ Date: ________________

## 2024-05-27 ENCOUNTER — CLINICAL SUPPORT (OUTPATIENT)
Dept: REHABILITATION | Facility: HOSPITAL | Age: 44
End: 2024-05-27
Payer: COMMERCIAL

## 2024-05-27 DIAGNOSIS — M25.551 HIP PAIN, BILATERAL: Primary | ICD-10-CM

## 2024-05-27 DIAGNOSIS — M25.552 HIP PAIN, BILATERAL: Primary | ICD-10-CM

## 2024-05-27 DIAGNOSIS — G89.29 CENTRAL SENSITIZATION TO PAIN: ICD-10-CM

## 2024-05-27 PROCEDURE — 97140 MANUAL THERAPY 1/> REGIONS: CPT | Mod: PO

## 2024-05-27 PROCEDURE — 97530 THERAPEUTIC ACTIVITIES: CPT | Mod: PO

## 2024-05-27 PROCEDURE — 97110 THERAPEUTIC EXERCISES: CPT | Mod: PO

## 2024-05-27 PROCEDURE — 97112 NEUROMUSCULAR REEDUCATION: CPT | Mod: PO

## 2024-05-27 NOTE — PROGRESS NOTES
"[facility]   Physical Therapy Treatment Note     Name: Sabrina White  Clinic Number: [id number]    Therapy Diagnosis:   Encounter Diagnoses   Name Primary?    Hip pain, bilateral Yes    Central sensitization to pain      Physician: Apolinar Hood MD    Visit Date: 5/27/2024    Physician Orders: PT Eval and Treat   Medical Diagnosis from Referral: M54.50,G89.29 (ICD-10-CM) - Chronic low back pain, unspecified back pain laterality, unspecified whether sciatica present  Evaluation Date: 5/17/2024  Authorization Period Expiration: 5/10/2025  Plan of Care Expiration: 7/12/2024  Progress Note Due: 6/14/2024  Date of Surgery: None  Visit # / Visits authorized: 1/20 + eval   FOTO: 1/ 3     Precautions: Standard     PTA Visit #: 0/5     Time In: 7:00 am  Time Out: 7:53 am  Total Billable Time: 53 minutes    SUBJECTIVE     Pt reports: that she's feeling better today than she did on her previous visit.  She was not compliant with home exercise program.  Response to previous treatment: Reductions in sensitivity  Functional change: Unable to be determined    Pain: 3/10  Location: right hip      OBJECTIVE     Objective Measures updated at progress report unless specified.     Treatment     Sabrina received the treatments listed below:      therapeutic exercises to develop strength, endurance, ROM, and flexibility for 21 minutes including:  Recumbent bike 6'   Supine hip flexor stretch with strap 3x 30" holds  LTR with pball 20x each  Bridges 3x10      manual therapy techniques: Joint mobilizations, Manual traction, Soft tissue Mobilization, and Manual Stretching were applied to the: both legs for 12 minutes, including:  Manual HSS  LAD to right hip      neuromuscular re-education activities to improve: Balance, Coordination, Kinesthetic, Sense, and Proprioception for 12 minutes. The following activities were include:  SLR 3x10 aquiles  Heel digs 2x12  S/l clamshells 3x12 GTB aquiles      therapeutic activities " to improve functional performance for 8  minutes, including:  Leg press 3x10 30#      Patient Education and Home Exercises     Home Exercises Provided and Patient Education Provided     Education provided:   - patient was educated on importance of HEP compliance and breaking up physical activity bouts at home    Written Home Exercises Provided: Patient instructed to cont prior HEP. Exercises were reviewed and Sabrina was able to demonstrate them prior to the end of the session.  Sabrina demonstrated good  understanding of the education provided. See EMR under Patient Instructions for exercises provided during therapy sessions    ASSESSMENT     Patient with mostly good tolerance to all therex performed this visit. Complains of symptoms that seem to be related to muscle guarding and intra-articular pain in right groin area during piriformis stretch exercise. Also reports pain around the groin with large range of motion SLR. Encourage patient to reduce range of motion on SLR and then discontinued her piriformis stretch. Will f/u with patient following today's visit to determine if exercises should be progressed or modified during future appointments to avoid increasing symptoms into both hips.    Sabrina Is progressing well towards her goals.   Pt prognosis is Good.     Pt will continue to benefit from skilled outpatient physical therapy to address the deficits listed in the problem list box on initial evaluation, provide pt/family education and to maximize pt's level of independence in the home and community environment.     Pt's spiritual, cultural and educational needs considered and pt agreeable to plan of care and goals.     Anticipated barriers to physical therapy: Fibromylagia    Goals:   Short Term Goals: 4 weeks   Patient will be independent performing HEP in a pain-free way to impact her progress with physical therapy.  Patient will be able to move through normal lumbar AROM without pain to impact her ability to  perform normal dressing tasks  Patient will report average, resting pain as </= 2/10 to impact her QoL.     Long Term Goals: 8 weeks   Patient will improve her FOTO score to >/= 53 to demonstrate significant improvements in function and ADL performance  Patient will report 2 consecutive weeks of no morning stiffness or limping to impact her mobility  Patient will report 50% reduction in kinesiophobia to impact her comfort with performing tasks and chores around her home.  Patient will report worst low back and hip pain as </= 6/10 to impact her tolerance to performing physical activity.    PLAN     Continue with MAICO Auguste, PT

## 2024-06-07 ENCOUNTER — CLINICAL SUPPORT (OUTPATIENT)
Dept: REHABILITATION | Facility: HOSPITAL | Age: 44
End: 2024-06-07
Payer: COMMERCIAL

## 2024-06-07 DIAGNOSIS — M25.551 HIP PAIN, BILATERAL: Primary | ICD-10-CM

## 2024-06-07 DIAGNOSIS — G89.29 CENTRAL SENSITIZATION TO PAIN: ICD-10-CM

## 2024-06-07 DIAGNOSIS — M25.552 HIP PAIN, BILATERAL: Primary | ICD-10-CM

## 2024-06-07 PROCEDURE — 97113 AQUATIC THERAPY/EXERCISES: CPT | Mod: PO,CQ

## 2024-06-07 NOTE — PROGRESS NOTES
Physical Therapy Aquatic Treatment Note     Name: Sabrina White  Clinic Number: [id number]    Therapy Diagnosis:   Encounter Diagnoses   Name Primary?    Hip pain, bilateral Yes    Central sensitization to pain      Physician: Apolinar Hood MD  Visit Date: 6/7/2024  Physician Orders: PT Eval and Treat   Medical Diagnosis from Referral: M54.50,G89.29 (ICD-10-CM) - Chronic low back pain, unspecified back pain laterality, unspecified whether sciatica present  Evaluation Date: 5/17/2024  Authorization Period Expiration: 5/10/2025  Plan of Care Expiration: 7/12/2024  Progress Note Due: 6/14/2024  Date of Surgery: None  Visit # / Visits authorized: 3/ 20   FOTO: 1/ 3     Precautions: Standard      Time In: 7:13  Time Out: 803  Total Billable Time: 47 minutes    Subjective     Pt reports: that she has B LBP and R hip p today. she was not compliant with home exercise program given last session.     Pain: 2/10  Location: bilateral back , R hip    Objective     Sabrina received aquatic exercises to develop strength, endurance, ROM, flexibility, posture, and core stabilization for 47 minutes including:  AMB 3 min each  FWD 1.0 mph  BWD 0.9 mph  Side  0.8 mph    Stretches 3 x 20 sec  HSS  Quad    LE exs 10x each  Mini Squat with QS  Heel Raise with GS  Hip ext with HS curl  Hip flex with LAQ  Lunges Side/FWD    UE exs 10x each  Shld flex/ext  Shld Horiz ABD/ADD  Ball pushdown    Endurance 3 min each  Marching  Bicycle pink noodle      Home Exercises Provided and Patient Education Provided     Education provided:   - progress towards goals   - role of therapy     Written Home Exercises Provided: Patient was not issued HEP for pool.  Exercises were reviewed and Sabrina was able to demonstrate them prior to the end of the session.   Pt received a written copy of exercises to perform at home.     Sabrina demonstrated good  understanding of the education provided.     Assessment     Sabrina carlos today's tx with initiation of ther ex fair  due to some complaint of onset of R knee pn post hyper extension during LAQ. She reported no increase in pn sx post tx. She did require VCs for proper form, posture and core stab tech with first few reps of exs post instruction.   Sabrina Is progressing well towards her goals.   Pt prognosis is Good.     Pt will continue to benefit from skilled outpatient physical therapy to address the deficits listed in the problem list box on initial evaluation, provide pt/family education and to maximize pt's level of independence in the home and community environment.     Pt's spiritual, cultural and educational needs considered and pt agreeable to plan of care and goals.    Anticipated barriers to physical therapy: High anxiety and variability of pain    Goals: Short Term Goals: 4 weeks   Patient will be independent performing HEP in a pain-free way to impact her progress with physical therapy.  Patient will be able to move through normal lumbar AROM without pain to impact her ability to perform normal dressing tasks  Patient will report average, resting pain as </= 2/10 to impact her QoL.     Long Term Goals: 8 weeks   Patient will improve her FOTO score to >/= 53 to demonstrate significant improvements in function and ADL performance  Patient will report 2 consecutive weeks of no morning stiffness or limping to impact her mobility  Patient will report 50% reduction in kinesiophobia to impact her comfort with performing tasks and chores around her home.  Patient will report worst low back and hip pain as </= 6/10 to impact her tolerance to performing physical activity.    Plan     Continue 2x per week. Plan of care Certification: 5/17/2024 to 7/12/2024.     Outpatient Physical Therapy 2 times weekly for 8 weeks to include the following interventions: Aquatic Therapy, Manual Therapy, Moist Heat/ Ice, Neuromuscular Re-ed, Patient Education, Therapeutic Activities, Therapeutic Exercise, and Dry Needling     Santiago Harris PTA

## 2024-06-17 ENCOUNTER — CLINICAL SUPPORT (OUTPATIENT)
Dept: REHABILITATION | Facility: HOSPITAL | Age: 44
End: 2024-06-17
Payer: COMMERCIAL

## 2024-06-17 DIAGNOSIS — M25.552 HIP PAIN, BILATERAL: Primary | ICD-10-CM

## 2024-06-17 DIAGNOSIS — G89.29 CENTRAL SENSITIZATION TO PAIN: ICD-10-CM

## 2024-06-17 DIAGNOSIS — G89.29 CHRONIC BILATERAL LOW BACK PAIN WITHOUT SCIATICA: ICD-10-CM

## 2024-06-17 DIAGNOSIS — M25.551 HIP PAIN, BILATERAL: Primary | ICD-10-CM

## 2024-06-17 DIAGNOSIS — M54.50 CHRONIC BILATERAL LOW BACK PAIN WITHOUT SCIATICA: ICD-10-CM

## 2024-06-17 PROCEDURE — 97530 THERAPEUTIC ACTIVITIES: CPT | Mod: PO

## 2024-06-17 PROCEDURE — 97110 THERAPEUTIC EXERCISES: CPT | Mod: PO

## 2024-06-17 PROCEDURE — 97140 MANUAL THERAPY 1/> REGIONS: CPT | Mod: PO

## 2024-06-17 PROCEDURE — 97112 NEUROMUSCULAR REEDUCATION: CPT | Mod: PO

## 2024-06-17 NOTE — PROGRESS NOTES
"[facility]   Physical Therapy Treatment Note     Name: Sabrina White  Clinic Number: [id number]    Therapy Diagnosis:   Encounter Diagnoses   Name Primary?    Hip pain, bilateral Yes    Central sensitization to pain     Chronic bilateral low back pain without sciatica      Physician: Apolinar Hood MD    Visit Date: 6/17/2024    Physician Orders: PT Eval and Treat   Medical Diagnosis from Referral: M54.50,G89.29 (ICD-10-CM) - Chronic low back pain, unspecified back pain laterality, unspecified whether sciatica present  Evaluation Date: 5/17/2024  Authorization Period Expiration: 5/10/2025  Plan of Care Expiration: 7/12/2024  Progress Note Due: 6/14/2024  Date of Surgery: None  Visit # / Visits authorized: 3/20 + eval   FOTO: 2/ 3     Precautions: Standard     PTA Visit #: 0/5     Time In: 7:01 am  Time Out: 7:55 am  Total Billable Time: 54 minutes    SUBJECTIVE     Pt reports: that she's very stressed and anxious over her mother being in the hospital, and she is the only one who is taking care of her. States that she's too distracted to have any pain.  She was not compliant with home exercise program.  Response to previous treatment: Reductions in sensitivity  Functional change: Unable to be determined    Pain: 0/10  Location: right hip      OBJECTIVE     Objective Measures updated at progress report unless specified.     Treatment     Sabrina received the treatments listed below:      therapeutic exercises to develop strength, endurance, ROM, and flexibility for 23 minutes including:  Recumbent bike 6'   Supine hip flexor stretch with strap 3x 30" holds  LTR with pball 20x each  Bridges 3x12      manual therapy techniques: Joint mobilizations, Manual traction, Soft tissue Mobilization, and Manual Stretching were applied to the: both legs for 8 minutes, including:  Manual HSS  LAD to right hip      neuromuscular re-education activities to improve: Balance, Coordination, Kinesthetic, Sense, " and Proprioception for 14 minutes. The following activities were include:  SLR 3x10 aquiles  Heel digs 3x10  S/l clamshells 3x12 GTB aquiles      therapeutic activities to improve functional performance for 9 minutes, including:  Leg press 3x10 30#  Medicine ball deadlift 6# 3x8      Patient Education and Home Exercises     Home Exercises Provided and Patient Education Provided     Education provided:   - patient was educated on importance of HEP compliance and breaking up physical activity bouts at home    Written Home Exercises Provided: Patient instructed to cont prior HEP. Exercises were reviewed and Sabrina was able to demonstrate them prior to the end of the session.  Sabrina demonstrated good  understanding of the education provided. See EMR under Patient Instructions for exercises provided during therapy sessions    ASSESSMENT     Patient with reductions in symptoms today but may be more so due to distraction with most of her attention towards her mother in the hospital. No complaints with any of the exercises or progressions performed this visit. Will continue to monitor patient's level of pain as it may vary depending on her level of stress.     Sabrina Is progressing well towards her goals.   Pt prognosis is Good.     Pt will continue to benefit from skilled outpatient physical therapy to address the deficits listed in the problem list box on initial evaluation, provide pt/family education and to maximize pt's level of independence in the home and community environment.     Pt's spiritual, cultural and educational needs considered and pt agreeable to plan of care and goals.     Anticipated barriers to physical therapy: Fibromylagia    Goals:   Short Term Goals: 4 weeks   Patient will be independent performing HEP in a pain-free way to impact her progress with physical therapy.  Patient will be able to move through normal lumbar AROM without pain to impact her ability to perform normal dressing tasks  Patient will  report average, resting pain as </= 2/10 to impact her QoL.     Long Term Goals: 8 weeks   Patient will improve her FOTO score to >/= 53 to demonstrate significant improvements in function and ADL performance  Patient will report 2 consecutive weeks of no morning stiffness or limping to impact her mobility  Patient will report 50% reduction in kinesiophobia to impact her comfort with performing tasks and chores around her home.  Patient will report worst low back and hip pain as </= 6/10 to impact her tolerance to performing physical activity.    PLAN     Continue with POC    Mathieu Auguste, PT

## 2024-06-21 ENCOUNTER — CLINICAL SUPPORT (OUTPATIENT)
Dept: REHABILITATION | Facility: HOSPITAL | Age: 44
End: 2024-06-21
Payer: COMMERCIAL

## 2024-06-21 DIAGNOSIS — M25.552 HIP PAIN, BILATERAL: Primary | ICD-10-CM

## 2024-06-21 DIAGNOSIS — G89.29 CENTRAL SENSITIZATION TO PAIN: ICD-10-CM

## 2024-06-21 DIAGNOSIS — M25.551 HIP PAIN, BILATERAL: Primary | ICD-10-CM

## 2024-06-21 PROCEDURE — 97113 AQUATIC THERAPY/EXERCISES: CPT | Mod: PO,CQ

## 2024-06-21 NOTE — PROGRESS NOTES
Physical Therapy Aquatic Treatment Note     Name: Sabrina White  Clinic Number: [id number]    Therapy Diagnosis:   Encounter Diagnoses   Name Primary?    Hip pain, bilateral Yes    Central sensitization to pain      Physician: Apolinar Hood MD  Visit Date: 6/21/2024  Physician Orders: PT Eval and Treat   Medical Diagnosis from Referral: M54.50,G89.29 (ICD-10-CM) - Chronic low back pain, unspecified back pain laterality, unspecified whether sciatica present  Evaluation Date: 5/17/2024  Authorization Period Expiration: 5/10/2025  Plan of Care Expiration: 7/12/2024  Progress Note Due: 6/14/2024  Date of Surgery: None  Visit # / Visits authorized: 5/ 20   FOTO: 1/ 3     Precautions: Standard      Time In: 7:06  Time Out: 802  Total Billable Time: 56 minutes    Subjective     Pt reports: that she has B LBP with movements like flex  she was not compliant with home exercise program given last session.     Pain: 4/10 pre-tx, 3/10 post  Location: bilateral back , R hip    Objective     Sabrina received aquatic exercises to develop strength, endurance, ROM, flexibility, posture, and core stabilization for 56 minutes including:  AMB 3 min each  FWD 1.0 mph  BWD 0.9 mph  Side  0.8 mph    Stretches 3 x 20 sec  HSS  Quad    LE exs 15x each  Mini Squat with QS  Heel Raise with GS  Hip ext with HS curl  Hip flex with LAQ  Lunges Side/FWD    UE exs 10x each  Shld flex/ext  Shld Horiz ABD/ADD  Ball pushdown  Horiz Row orange t-cord  Lat Pull orange t-cored    Endurance 3 min each  Marching  Bicycle pink noodle      Home Exercises Provided and Patient Education Provided     Education provided:   - progress towards goals   - role of therapy     Written Home Exercises Provided: Patient was not issued HEP for pool.  Exercises were reviewed and Sabrina was able to demonstrate them prior to the end of the session.   Pt received a written copy of exercises to perform at home.     Sabrina demonstrated good  understanding of the education  provided.     Assessment     Sabrina carlos today's tx with progression of ther ex well with benefit of decreased pn sx post tx as above.. She was able to progress with reps and core stab exs on this date w/o difficulty or complaint. She did require VCs for proper form, posture and core stab tech with first few reps of exs post instruction but overall good recall of exs from previous tx.    Sabrina Is progressing well towards her goals.   Pt prognosis is Good.     Pt will continue to benefit from skilled outpatient physical therapy to address the deficits listed in the problem list box on initial evaluation, provide pt/family education and to maximize pt's level of independence in the home and community environment.     Pt's spiritual, cultural and educational needs considered and pt agreeable to plan of care and goals.    Anticipated barriers to physical therapy: High anxiety and variability of pain    Goals: Short Term Goals: 4 weeks   Patient will be independent performing HEP in a pain-free way to impact her progress with physical therapy.  Patient will be able to move through normal lumbar AROM without pain to impact her ability to perform normal dressing tasks  Patient will report average, resting pain as </= 2/10 to impact her QoL.     Long Term Goals: 8 weeks   Patient will improve her FOTO score to >/= 53 to demonstrate significant improvements in function and ADL performance  Patient will report 2 consecutive weeks of no morning stiffness or limping to impact her mobility  Patient will report 50% reduction in kinesiophobia to impact her comfort with performing tasks and chores around her home.  Patient will report worst low back and hip pain as </= 6/10 to impact her tolerance to performing physical activity.    Plan     Continue 2x per week. Plan of care Certification: 5/17/2024 to 7/12/2024.     Outpatient Physical Therapy 2 times weekly for 8 weeks to include the following interventions: Aquatic Therapy,  Manual Therapy, Moist Heat/ Ice, Neuromuscular Re-ed, Patient Education, Therapeutic Activities, Therapeutic Exercise, and Dry Needling     Santiago Harris, PTA